# Patient Record
Sex: MALE | Race: WHITE
[De-identification: names, ages, dates, MRNs, and addresses within clinical notes are randomized per-mention and may not be internally consistent; named-entity substitution may affect disease eponyms.]

---

## 2018-06-06 ENCOUNTER — HOSPITAL ENCOUNTER (EMERGENCY)
Dept: HOSPITAL 89 - ER | Age: 68
Discharge: HOME | End: 2018-06-06
Payer: MEDICARE

## 2018-06-06 VITALS — DIASTOLIC BLOOD PRESSURE: 69 MMHG | SYSTOLIC BLOOD PRESSURE: 102 MMHG

## 2018-06-06 DIAGNOSIS — B95.8: ICD-10-CM

## 2018-06-06 DIAGNOSIS — I89.0: ICD-10-CM

## 2018-06-06 DIAGNOSIS — L03.116: Primary | ICD-10-CM

## 2018-06-06 LAB — PLATELET COUNT, AUTOMATED: 246 K/UL (ref 150–450)

## 2018-06-06 PROCEDURE — 84075 ASSAY ALKALINE PHOSPHATASE: CPT

## 2018-06-06 PROCEDURE — 84132 ASSAY OF SERUM POTASSIUM: CPT

## 2018-06-06 PROCEDURE — 84520 ASSAY OF UREA NITROGEN: CPT

## 2018-06-06 PROCEDURE — 93971 EXTREMITY STUDY: CPT

## 2018-06-06 PROCEDURE — 84155 ASSAY OF PROTEIN SERUM: CPT

## 2018-06-06 PROCEDURE — 82565 ASSAY OF CREATININE: CPT

## 2018-06-06 PROCEDURE — 84460 ALANINE AMINO (ALT) (SGPT): CPT

## 2018-06-06 PROCEDURE — 82435 ASSAY OF BLOOD CHLORIDE: CPT

## 2018-06-06 PROCEDURE — 82374 ASSAY BLOOD CARBON DIOXIDE: CPT

## 2018-06-06 PROCEDURE — 86140 C-REACTIVE PROTEIN: CPT

## 2018-06-06 PROCEDURE — 82040 ASSAY OF SERUM ALBUMIN: CPT

## 2018-06-06 PROCEDURE — 87040 BLOOD CULTURE FOR BACTERIA: CPT

## 2018-06-06 PROCEDURE — 96365 THER/PROPH/DIAG IV INF INIT: CPT

## 2018-06-06 PROCEDURE — 84295 ASSAY OF SERUM SODIUM: CPT

## 2018-06-06 PROCEDURE — 82247 BILIRUBIN TOTAL: CPT

## 2018-06-06 PROCEDURE — 82310 ASSAY OF CALCIUM: CPT

## 2018-06-06 PROCEDURE — 36415 COLL VENOUS BLD VENIPUNCTURE: CPT

## 2018-06-06 PROCEDURE — 85025 COMPLETE CBC W/AUTO DIFF WBC: CPT

## 2018-06-06 PROCEDURE — 81001 URINALYSIS AUTO W/SCOPE: CPT

## 2018-06-06 PROCEDURE — 99284 EMERGENCY DEPT VISIT MOD MDM: CPT

## 2018-06-06 PROCEDURE — 84450 TRANSFERASE (AST) (SGOT): CPT

## 2018-06-06 PROCEDURE — 82947 ASSAY GLUCOSE BLOOD QUANT: CPT

## 2018-06-06 PROCEDURE — 85651 RBC SED RATE NONAUTOMATED: CPT

## 2018-06-06 NOTE — ER REPORT
History and Physical


Time Seen By MD:  20:59


Hx. of Stated Complaint:  


pt reports intermittent lymphedema in R leg for years from cancer. recently has 


had increased swelling and redness as well as chills/hot flashes


HPI/ROS


CHIEF COMPLAINT: Right leg swelling and redness





HISTORY OF PRESENT ILLNESS: 68-year-old male with history of chronic medical 

problems including cancer with lymph node dissection in the right groin.  

Patient has chronic lymphedema of the right lower extremity.  Usually eats very 

mild.  Occasionally it swells.  He is traveling from [place] to [place].  Patient's 

been in the car for the last 2-3 days.  His leg is had increasing swelling and 

redness.  He's having some subjective chills and fever.  He is concerned is 

right lower extremity is infected.





REVIEW OF SYSTEMS:


Respiratory: No cough, no dyspnea.


Cardiovascular: No chest pain, no palpitations.


Gastrointestinal: No vomiting, no abdominal pain.


Musculoskeletal: No back pain.


Allergies:  


Coded Allergies:  


     No Known Drug Allergies (Unverified , 6/6/18)


Home Meds


Active Scripts


Ciprofloxacin Hcl (CIPRO) 500 Mg Tablet, 500 MG PO BID for infection, #14


   Prov:JESSICA GRIGSBY DO         6/6/18


Clindamycin Hcl (CLINDAMYCIN HCL) 300 Mg Capsule, 300 MG PO TID for infection, #

20 CAPSULE


   TAKE 1 CAPSULE EVERY


   SIX HOURS


   Prov:JESSICA GRIGSBY DO         6/6/18


Past Medical/Surgical History


Past medical history: Recurrent sinus infections, frequent pneumonia, Merkel 

cell carcinoma/neuroendocrine tumor


Past surgical history: Multiple orthopedic surgeries, neuroendocrine tumor 

resection, lymph node dissection


Reviewed Nurses Notes:  Yes


Old Medical Records Reviewed:  Yes


Constitutional





Vital Sign - Last 24 Hours








 6/6/18 6/6/18 6/6/18 6/6/18





 20:58 20:58 21:00 21:19


 


Temp 97.6   


 


Pulse 120   112


 


Resp 18   


 


B/P (MAP) 122/102 156/79 (104) 122/102 (109) 


 


Pulse Ox 90   


 


O2 Delivery Room Air   


 


    





 6/6/18 6/6/18 6/6/18 6/6/18





 21:30 22:00 22:30 23:00


 


B/P (MAP) 116/93 (101) 103/65 (78) 94/61 (72) 103/65 (78)





 6/6/18   





 23:30   


 


B/P (MAP) 102/69 (80)   








Physical Exam


   General appearance: Mild distress


Respiratory: Chest is non tender, lungs are clear to auscultation.


Cardiac: Regular rate and rhythm 


Extremities: Examination of the right lower extremity reveals gross edema to 

the groin.  There is probably 3+ edema throughout the entire leg.  There are 

gross erythema to the knee.  There is a spotty rash on the medial thigh.  There 

is no inguinal lymphadenopathy.  There are intact pulses in the right foot





DIFFERENTIAL DIAGNOSIS: After history and physical exam differential diagnosis 

was considered for cellulitis, DVT, lymphedema





Medical Decision Making


Data Points


Result Diagram:  


6/6/18 2117 6/6/18 2117





Laboratory





Hematology








Test


  6/6/18


21:17 6/6/18


21:55


 


Red Blood Count


  4.62 M/uL


(4.00-5.60) 


 


 


Mean Corpuscular Volume


  93.4 fL


(80.0-96.0) 


 


 


Mean Corpuscular Hemoglobin


  33.1 pg


(26.0-33.0) 


 


 


Mean Corpuscular Hemoglobin


Concent 35.5 g/dL


(32.0-36.0) 


 


 


Red Cell Distribution Width


  13.5 %


(11.5-14.5) 


 


 


Mean Platelet Volume


  8.2 fL


(7.2-11.1) 


 


 


Neutrophils (%) (Auto)


  77.3 %


(39.4-72.5) 


 


 


Lymphocytes (%) (Auto)


  11.3 %


(17.6-49.6) 


 


 


Monocytes (%) (Auto)


  10.3 %


(4.1-12.4) 


 


 


Eosinophils (%) (Auto)


  0.7 %


(0.4-6.7) 


 


 


Basophils (%) (Auto)


  0.4 %


(0.3-1.4) 


 


 


Nucleated RBC Relative Count


(auto) 0.0 /100WBC 


  


 


 


Neutrophils # (Auto)


  10.2 K/uL


(2.0-7.4) 


 


 


Lymphocytes # (Auto)


  1.5 K/uL


(1.3-3.6) 


 


 


Monocytes # (Auto)


  1.4 K/uL


(0.3-1.0) 


 


 


Eosinophils # (Auto)


  0.1 K/uL


(0.0-0.5) 


 


 


Basophils # (Auto)


  0.1 K/uL


(0.0-0.1) 


 


 


Nucleated RBC Absolute Count


(auto) 0.00 K/uL 


  


 


 


Erythrocyte Sedimentation Rate


  34 mm/HOUR


(0-20) 


 


 


Sodium Level


  139 mmol/L


(137-145) 


 


 


Potassium Level


  3.3 mmol/L


(3.5-5.0) 


 


 


Chloride Level


  100 mmol/L


() 


 


 


Carbon Dioxide Level


  25 mmol/L


(22-30) 


 


 


Blood Urea Nitrogen


  21 mg/dl


(9-21) 


 


 


Creatinine


  1.20 mg/dl


(0.66-1.25) 


 


 


Glomerular Filtration Rate


Calc > 60.0 


  


 


 


Random Glucose


  175 mg/dl


() 


 


 


Calcium Level


  8.6 mg/dl


(8.4-10.2) 


 


 


Total Bilirubin


  0.9 mg/dl


(0.2-1.3) 


 


 


Aspartate Amino Transf


(AST/SGOT) 46 U/L (0-35) 


  


 


 


Alanine Aminotransferase


(ALT/SGPT) 48 U/L (0-56) 


  


 


 


Alkaline Phosphatase 93 U/L (0-126)  


 


C-Reactive Protein


  7.7 mg/dl


(<1.0) 


 


 


Total Protein


  7.3 gm/dl


(6.3-8.2) 


 


 


Albumin


  3.7 g/dl


(3.5-5.0) 


 


 


Urine Color  Rosmery 


 


Urine Clarity


  


  Slightly-cloudy


 


 


Urine pH


  


  5.0 pH


(4.8-9.5)


 


Urine Specific Gravity  1.030 


 


Urine Protein


  


  30 mg/dL


(NEGATIVE)


 


Urine Glucose (UA)


  


  Negative mg/dL


(NEGATIVE)


 


Urine Ketones


  


  Negative mg/dL


(NEGATIVE)


 


Urine Blood


  


  Negative


(NEGATIVE)


 


Urine Nitrite


  


  Negative


(NEGATIVE)


 


Urine Bilirubin


  


  Negative


(NEGATIVE)


 


Urine Urobilinogen


  


  4.0 mg/dL


(0.2-1.9)


 


Urine Leukocyte Esterase


  


  Negative


(NEGATIVE)


 


Urine RBC


  


  None /HPF


(0-2/HPF)


 


Urine WBC


  


  3 /HPF


(0-5/HPF)


 


Urine Squamous Epithelial


Cells 


  None /LPF


(</=FEW)


 


Urine Bacteria


  


  Negative /HPF


(NONE-FEW)


 


Urine Hyaline Casts


  


  Many /LPF


(NONE-FEW)


 


Urine Granular Casts


  


  Many /LPF


(NONE)


 


Urine Mucus


  


  Few /HPF


(NONE-FEW)








Chemistry








Test


  6/6/18


21:17 6/6/18


21:55


 


White Blood Count


  13.2 k/uL


(4.5-11.0) 


 


 


Red Blood Count


  4.62 M/uL


(4.00-5.60) 


 


 


Hemoglobin


  15.3 g/dL


(14.0-18.0) 


 


 


Hematocrit


  43.2 %


(42.0-52.0) 


 


 


Mean Corpuscular Volume


  93.4 fL


(80.0-96.0) 


 


 


Mean Corpuscular Hemoglobin


  33.1 pg


(26.0-33.0) 


 


 


Mean Corpuscular Hemoglobin


Concent 35.5 g/dL


(32.0-36.0) 


 


 


Red Cell Distribution Width


  13.5 %


(11.5-14.5) 


 


 


Platelet Count


  246 K/uL


(150-450) 


 


 


Mean Platelet Volume


  8.2 fL


(7.2-11.1) 


 


 


Neutrophils (%) (Auto)


  77.3 %


(39.4-72.5) 


 


 


Lymphocytes (%) (Auto)


  11.3 %


(17.6-49.6) 


 


 


Monocytes (%) (Auto)


  10.3 %


(4.1-12.4) 


 


 


Eosinophils (%) (Auto)


  0.7 %


(0.4-6.7) 


 


 


Basophils (%) (Auto)


  0.4 %


(0.3-1.4) 


 


 


Nucleated RBC Relative Count


(auto) 0.0 /100WBC 


  


 


 


Neutrophils # (Auto)


  10.2 K/uL


(2.0-7.4) 


 


 


Lymphocytes # (Auto)


  1.5 K/uL


(1.3-3.6) 


 


 


Monocytes # (Auto)


  1.4 K/uL


(0.3-1.0) 


 


 


Eosinophils # (Auto)


  0.1 K/uL


(0.0-0.5) 


 


 


Basophils # (Auto)


  0.1 K/uL


(0.0-0.1) 


 


 


Nucleated RBC Absolute Count


(auto) 0.00 K/uL 


  


 


 


Erythrocyte Sedimentation Rate


  34 mm/HOUR


(0-20) 


 


 


Glomerular Filtration Rate


Calc > 60.0 


  


 


 


Calcium Level


  8.6 mg/dl


(8.4-10.2) 


 


 


Total Bilirubin


  0.9 mg/dl


(0.2-1.3) 


 


 


Aspartate Amino Transf


(AST/SGOT) 46 U/L (0-35) 


  


 


 


Alanine Aminotransferase


(ALT/SGPT) 48 U/L (0-56) 


  


 


 


Alkaline Phosphatase 93 U/L (0-126)  


 


C-Reactive Protein


  7.7 mg/dl


(<1.0) 


 


 


Total Protein


  7.3 gm/dl


(6.3-8.2) 


 


 


Albumin


  3.7 g/dl


(3.5-5.0) 


 


 


Urine Color  Rosmery 


 


Urine Clarity


  


  Slightly-cloudy


 


 


Urine pH


  


  5.0 pH


(4.8-9.5)


 


Urine Specific Gravity  1.030 


 


Urine Protein


  


  30 mg/dL


(NEGATIVE)


 


Urine Glucose (UA)


  


  Negative mg/dL


(NEGATIVE)


 


Urine Ketones


  


  Negative mg/dL


(NEGATIVE)


 


Urine Blood


  


  Negative


(NEGATIVE)


 


Urine Nitrite


  


  Negative


(NEGATIVE)


 


Urine Bilirubin


  


  Negative


(NEGATIVE)


 


Urine Urobilinogen


  


  4.0 mg/dL


(0.2-1.9)


 


Urine Leukocyte Esterase


  


  Negative


(NEGATIVE)


 


Urine RBC


  


  None /HPF


(0-2/HPF)


 


Urine WBC


  


  3 /HPF


(0-5/HPF)


 


Urine Squamous Epithelial


Cells 


  None /LPF


(</=FEW)


 


Urine Bacteria


  


  Negative /HPF


(NONE-FEW)


 


Urine Hyaline Casts


  


  Many /LPF


(NONE-FEW)


 


Urine Granular Casts


  


  Many /LPF


(NONE)


 


Urine Mucus


  


  Few /HPF


(NONE-FEW)








Urinalysis








Test


  6/6/18


21:55


 


Urine Color Rosmery 


 


Urine Clarity


  Slightly-cloudy


 


 


Urine pH


  5.0 pH


(4.8-9.5)


 


Urine Specific Gravity 1.030 


 


Urine Protein


  30 mg/dL


(NEGATIVE)


 


Urine Glucose (UA)


  Negative mg/dL


(NEGATIVE)


 


Urine Ketones


  Negative mg/dL


(NEGATIVE)


 


Urine Blood


  Negative


(NEGATIVE)


 


Urine Nitrite


  Negative


(NEGATIVE)


 


Urine Bilirubin


  Negative


(NEGATIVE)


 


Urine Urobilinogen


  4.0 mg/dL


(0.2-1.9)


 


Urine Leukocyte Esterase


  Negative


(NEGATIVE)


 


Urine RBC


  None /HPF


(0-2/HPF)


 


Urine WBC


  3 /HPF


(0-5/HPF)


 


Urine Squamous Epithelial


Cells None /LPF


(</=FEW)


 


Urine Bacteria


  Negative /HPF


(NONE-FEW)


 


Urine Hyaline Casts


  Many /LPF


(NONE-FEW)


 


Urine Granular Casts


  Many /LPF


(NONE)


 


Urine Mucus


  Few /HPF


(NONE-FEW)











EKG/Imaging


Imaging


Results:


Ultrasound of the right lower extremity was obtained.  The results of the study 

are no evidence of DVT.  [The study was read by the radiologist].  [I viewed 

the images myself on the PACS system].





ED Course/Re-evaluation


Clinical Indication for ER IV:  IV Access


ED Course


Patient was admitted to an examination room.  H&P was done.  The dental 

diagnoses was considered.  Patient with right lower extremity cellulitis.  He 

also is chronic lymphedema.  He presents with a fever.  Patient's treated with 

IV clindamycin and Cipro by mouth after being pancultured.  An ultrasound was 

performed of his right lower extremity to rule out DVT.  He's been traveling in 

a car for the last 3 days.  Patient plans on continuing on his trip to [place].  

He'll be covered with Paul to mycin and Cipro.  He is advised to go to the 

nearest ER for any worsening.  His erythema is outlined with permanent marker.


Decision to Disposition Date:  Jun 6, 2018


Decision to Disposition Time:  21:16





Depart


Departure


Latest Vital Signs





Vital Signs








  Date Time  Temp Pulse Resp B/P (MAP) Pulse Ox O2 Delivery O2 Flow Rate FiO2


 


6/6/18 23:30    102/69 (80)    


 


6/6/18 21:19  112      


 


6/6/18 20:58 97.6  18  90 Room Air  








Impression:  


 Primary Impression:  


 Cellulitis of right lower extremity


 Additional Impression:  


 Lymphedema


Condition:  Improved


Disposition:  HOME OR SELF-CARE


New Scripts


Ciprofloxacin Hcl (CIPRO) 500 Mg Tablet


500 MG PO BID for infection, #14


   Prov: JESSICA GRIGSBY DO         6/6/18 


Clindamycin Hcl (CLINDAMYCIN HCL) 300 Mg Capsule


300 MG PO TID for infection, #20 CAPSULE


   TAKE 1 CAPSULE EVERY


   SIX HOURS


   Prov: JESSICA GRIGSBY DO         6/6/18


Patient Instructions:  Cellulitis (ED)





Additional Instructions:  


Keep leg elevated as much as possible


Apply heating pad to the leg or perform warm to hot soaks in a bathtub


Go to the nearest ER for any worsening


Follow-up with your primary care upon returning home to [place]





Problem Qualifiers











JESSICA GRIGSBY DO Jun 6, 2018 21:13

## 2018-06-06 NOTE — RADIOLOGY IMAGING REPORT
FACILITY: [facility] 

 

PATIENT NAME: Kenny Henson

: 1950

MR: [id number]

V: [id number]

EXAM DATE: 

ORDERING PHYSICIAN: JESSICA GRIGSBY

TECHNOLOGIST: 

 

Location: [facility]

Patient: Kenny Henson

: 1950

MRN: [id number]

Visit/Account:[id number]

Date of Sevice:  2018

 

ACCESSION #: [id number]

 

 

EXAMINATION: RIGHT LOWER EXTREMITY DOPPLER VENOUS ULTRASOUND

 

DATE: 2018 9:13 PM

 

INDICATION: Right lower extremity redness and swelling/edema.

 

TECHNIQUE: Grayscale, color and pulsed Doppler ultrasound was performed of the right lower extremity 
veins to evaluate for deep venous thrombosis.

 

COMPARISON: None.

 

FINDINGS:

 

The right common femoral, superficial femoral, and popliteal veins are compressible with normal flow 
on color Doppler and preserved venous waveform variation where assessed. There is also normal color D
oppler flow in the profunda femoris and greater saphenous veins.

 

The right posterior tibial and peroneal veins have patent color Doppler flow.

 

IMPRESSION:

 

No evidence of deep venous thrombosis in the right lower extremity.

 

Report Dictated By: Almas Zheng MD at 2018 11:15 PM

 

Report E-Signed By: lAmas Zheng MD  at 2018 11:16 PM

 

WSN:[id number]